# Patient Record
(demographics unavailable — no encounter records)

---

## 2024-10-28 NOTE — ASSESSMENT
[FreeTextEntry1] : 45 yp M. with PMH of Fatigue, HLD, Male infertility.  Productive cough, has SOB was given Albuterol inhaler by PCP which he uses it intermittently. Pain is from RIGHT chest and RIGHT back pain.   CXR - No active disease, clear lungs. - Oct 2024  PFT - FEV/FVC 71.- mild obstruction  On exam, lungs are clear, no wheezing or rhonchi, in NAD.  Care plans discussed - will trial Breztri (160mcg/9mcg/4.8mcg) 2 puffs BID for management of mild obstructive disease. Consider additional imaging if pain/cough persists. Follow-up in 3 months.

## 2024-11-15 NOTE — ASSESSMENT
[FreeTextEntry1] : 45 yp M. with PMH of Fatigue, HLD, Male infertility, mild asthma  Productive cough, has SOB was given Albuterol inhaler by PCP which he uses intermittently.  On last visit was started on Breztri which he states has now relieved his pain and cough.   CXR - No active disease, clear lungs. - Oct 2024  PFT - FEV/FVC 71.- mild obstruction  - Oct 2024  In office [facility] this visit = 19 On exam, lungs are clear, no wheezing or rhonchi, in NAD.  Care plans discussed - recommend he continue Breztri (160mcg/9mcg/4.8mcg) 2 puffs BID  mild Asthma. Follow-up in 3 months.

## 2024-12-18 NOTE — HISTORY OF PRESENT ILLNESS
[de-identified] : BEAN FREEMAN is a 46 year old man with PMH of mild asthma who presents for Hematology evaluation of lymphocytosis and anemia.  He was first referred to a private hematologist in 2020 for evaluation of persistent anemia and lymphocytosis for 1 year. 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 He did not follow up with this hematologist and never received any treatment for LGL.  More recently, he had a CBC in 10/2024 that showed macrocytic anemia (Hgb 12.3, ) with preserved WBC and platelet count. He had a lymphocytosis (ALC 5.73). No constitutional symptoms.   Family History: - Grandmother: DM   Social History: - , lives with his wife and son in [place] - Denies tobacco, alcohol, or drug use.

## 2024-12-18 NOTE — ASSESSMENT
[FreeTextEntry1] : BEAN FREEMAN is a 46 year old man with PMH of mild asthma who presents for Hematology evaluation of lymphocytosis and anemia.  # Lymphocytosis: First noted to have lymphocytosis in 2020. 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 He was subsequently lost to follow up.  - Labs: peripheral flow cytometry, TCR gene rearrangement  - If confirmed to have LGL, no clear indication for treatment at this time  # Macrocytic anemia: CBC in 10/2024 that showed macrocytic anemia (Hgb 12.3, ) with preserved WBC and platelet count. We will evaluate for nutritional deficiencies and thyroid disorder. - Labs: CBC, CMP, iron studies, vitamin B12, folate, and TSH  Pending lab results, will arrange follow up as clinically indicated

## 2024-12-18 NOTE — HISTORY OF PRESENT ILLNESS
[de-identified] : BEAN FREEMAN is a 46 year old man with PMH of mild asthma who presents for Hematology evaluation of lymphocytosis and anemia.  He was first referred to a private hematologist in 2020 for evaluation of persistent anemia and lymphocytosis for 1 year. 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 He did not follow up with this hematologist and never received any treatment for LGL.  More recently, he had a CBC in 10/2024 that showed macrocytic anemia (Hgb 12.3, ) with preserved WBC and platelet count. He had a lymphocytosis (ALC 5.73). No constitutional symptoms.   Family History: - Grandmother: DM   Social History: - , lives with his wife and son in [place] - Denies tobacco, alcohol, or drug use.

## 2025-02-10 NOTE — ASSESSMENT
[FreeTextEntry1] : 45 yp M. with PMH of Fatigue, HLD, Male infertility, mild asthma  Continues on Breztri with reliable relief.   CXR - No active disease, clear lungs. - Oct 2024  PFT - FEV/FVC 71.- mild obstruction  - Oct 2024  On exam, lungs are clear, no wheezing or rhonchi, in NAD.  Care plans discussed -- continue Breztri (160mcg/9mcg/4.8mcg) 2 puffs BID for management of mild Asthma. Follow-up in 3 months.

## 2025-06-02 NOTE — ASSESSMENT
[FreeTextEntry1] : BEAN FREEMAN is a 46 year old man with PMH of mild asthma who presents for Hematology follow up of T-cell LGL.   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 Peripheral blood flow cytometry detected increased CD8+, CD5+ (dim), CD7+ (dim) T cells with a clonal T cell population, consistent with LGL. - Labs: CBC, CMP, and LDH - Follow up in 6 months or sooner if clinical status changes. Given that patient is asymptomatic, there is no role for treatment currently.

## 2025-06-02 NOTE — END OF VISIT
[] : Fellow [FreeTextEntry3] : I discussed this patient in a pre-clinic session with the fellow including review of clinical status and last labs. I was present with the resident/fellow during the key portions of the history and exam. I discussed the case with the resident/fellow and agree with the findings and plan as documented in the note. [Time Spent: ___ minutes] : I have spent [unfilled] minutes of time on the encounter which excludes teaching and separately reported services.

## 2025-06-02 NOTE — HISTORY OF PRESENT ILLNESS
[de-identified] : BEAN FREEMAN is a 46 year old man with PMH of mild asthma who presents for Hematology follow up of T-cell LGL.   He was first referred to a private hematologist in 2020 for evaluation of persistent anemia and lymphocytosis for 1 year. 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 He did not follow up with this hematologist and never received any treatment for LGL.  He established care in our office in 12/2024. His labs were consistent with T-cell LGL and mild cytopenias.   Interval History: - Patient presents for a follow up. Since his last visit, he was started on a Breztri inhaler that has helped with his cough. He no longer has been taking it currently. Denies any fevers, chills, weight loss.  Family History: - Grandmother: DM   Social History: - , lives with his wife and son in [place] - Denies tobacco, alcohol, or drug use.